# Patient Record
Sex: FEMALE | Race: BLACK OR AFRICAN AMERICAN | Employment: STUDENT | URBAN - METROPOLITAN AREA
[De-identification: names, ages, dates, MRNs, and addresses within clinical notes are randomized per-mention and may not be internally consistent; named-entity substitution may affect disease eponyms.]

---

## 2018-07-06 ENCOUNTER — HOSPITAL ENCOUNTER (OUTPATIENT)
Age: 23
Discharge: HOME | End: 2018-07-08
Payer: COMMERCIAL

## 2018-07-06 PROCEDURE — 86706 HEP B SURFACE ANTIBODY: CPT

## 2018-07-09 LAB — HBV SURFACE AB TITR SER: REACTIVE {TITER}

## 2019-08-09 ENCOUNTER — OFFICE VISIT (OUTPATIENT)
Dept: FAMILY MEDICINE CLINIC | Age: 24
End: 2019-08-09

## 2019-08-09 VITALS
SYSTOLIC BLOOD PRESSURE: 108 MMHG | RESPIRATION RATE: 16 BRPM | HEIGHT: 63 IN | DIASTOLIC BLOOD PRESSURE: 68 MMHG | WEIGHT: 119.6 LBS | BODY MASS INDEX: 21.19 KG/M2 | OXYGEN SATURATION: 99 % | TEMPERATURE: 98.7 F | HEART RATE: 82 BPM

## 2019-08-09 DIAGNOSIS — R07.0 PAIN IN THROAT: ICD-10-CM

## 2019-08-09 DIAGNOSIS — J01.90 ACUTE NON-RECURRENT SINUSITIS, UNSPECIFIED LOCATION: Primary | ICD-10-CM

## 2019-08-09 DIAGNOSIS — R09.82 POSTNASAL DRIP: ICD-10-CM

## 2019-08-09 PROCEDURE — 99213 OFFICE O/P EST LOW 20 MIN: CPT | Performed by: PHYSICIAN ASSISTANT

## 2019-08-09 RX ORDER — CEFUROXIME AXETIL 500 MG/1
500 TABLET ORAL 2 TIMES DAILY
Qty: 20 TABLET | Refills: 0 | Status: SHIPPED | OUTPATIENT
Start: 2019-08-09 | End: 2019-08-19

## 2019-08-09 RX ORDER — METHYLPREDNISOLONE 4 MG/1
TABLET ORAL
Qty: 1 KIT | Refills: 0 | Status: SHIPPED
Start: 2019-08-09 | End: 2020-08-28 | Stop reason: ALTCHOICE

## 2020-02-04 ENCOUNTER — NURSE ONLY (OUTPATIENT)
Dept: PRIMARY CARE CLINIC | Age: 25
End: 2020-02-04

## 2020-02-04 PROCEDURE — 86580 TB INTRADERMAL TEST: CPT | Performed by: NURSE PRACTITIONER

## 2020-02-06 LAB
INDURATION: NORMAL
TB SKIN TEST: NORMAL

## 2020-08-28 ENCOUNTER — OFFICE VISIT (OUTPATIENT)
Dept: FAMILY MEDICINE CLINIC | Age: 25
End: 2020-08-28
Payer: COMMERCIAL

## 2020-08-28 VITALS
TEMPERATURE: 97.3 F | BODY MASS INDEX: 20.73 KG/M2 | RESPIRATION RATE: 18 BRPM | DIASTOLIC BLOOD PRESSURE: 62 MMHG | HEART RATE: 87 BPM | WEIGHT: 117 LBS | SYSTOLIC BLOOD PRESSURE: 102 MMHG | HEIGHT: 63 IN | OXYGEN SATURATION: 97 %

## 2020-08-28 PROCEDURE — 99213 OFFICE O/P EST LOW 20 MIN: CPT | Performed by: PHYSICIAN ASSISTANT

## 2020-08-28 RX ORDER — NAPROXEN 500 MG/1
500 TABLET ORAL 2 TIMES DAILY WITH MEALS
Qty: 20 TABLET | Refills: 0 | Status: SHIPPED
Start: 2020-08-28 | End: 2021-05-24

## 2021-05-24 ENCOUNTER — OFFICE VISIT (OUTPATIENT)
Dept: FAMILY MEDICINE CLINIC | Age: 26
End: 2021-05-24
Payer: COMMERCIAL

## 2021-05-24 VITALS
HEART RATE: 88 BPM | WEIGHT: 119 LBS | TEMPERATURE: 97.6 F | SYSTOLIC BLOOD PRESSURE: 104 MMHG | OXYGEN SATURATION: 98 % | DIASTOLIC BLOOD PRESSURE: 64 MMHG | BODY MASS INDEX: 21.08 KG/M2

## 2021-05-24 DIAGNOSIS — Z00.00 ANNUAL PHYSICAL EXAM: Primary | ICD-10-CM

## 2021-05-24 PROCEDURE — 99395 PREV VISIT EST AGE 18-39: CPT | Performed by: PHYSICIAN ASSISTANT

## 2021-05-24 SDOH — ECONOMIC STABILITY: FOOD INSECURITY: WITHIN THE PAST 12 MONTHS, THE FOOD YOU BOUGHT JUST DIDN'T LAST AND YOU DIDN'T HAVE MONEY TO GET MORE.: NEVER TRUE

## 2021-05-24 SDOH — ECONOMIC STABILITY: FOOD INSECURITY: WITHIN THE PAST 12 MONTHS, YOU WORRIED THAT YOUR FOOD WOULD RUN OUT BEFORE YOU GOT MONEY TO BUY MORE.: NEVER TRUE

## 2021-05-24 SDOH — ECONOMIC STABILITY: TRANSPORTATION INSECURITY
IN THE PAST 12 MONTHS, HAS LACK OF TRANSPORTATION KEPT YOU FROM MEETINGS, WORK, OR FROM GETTING THINGS NEEDED FOR DAILY LIVING?: NO

## 2021-05-24 SDOH — ECONOMIC STABILITY: TRANSPORTATION INSECURITY
IN THE PAST 12 MONTHS, HAS THE LACK OF TRANSPORTATION KEPT YOU FROM MEDICAL APPOINTMENTS OR FROM GETTING MEDICATIONS?: NO

## 2021-05-24 ASSESSMENT — ENCOUNTER SYMPTOMS
ALLERGIC/IMMUNOLOGIC NEGATIVE: 1
EYES NEGATIVE: 1
GASTROINTESTINAL NEGATIVE: 1
RESPIRATORY NEGATIVE: 1

## 2021-05-24 ASSESSMENT — PATIENT HEALTH QUESTIONNAIRE - PHQ9
SUM OF ALL RESPONSES TO PHQ QUESTIONS 1-9: 0
2. FEELING DOWN, DEPRESSED OR HOPELESS: 0
SUM OF ALL RESPONSES TO PHQ QUESTIONS 1-9: 0
SUM OF ALL RESPONSES TO PHQ QUESTIONS 1-9: 0
1. LITTLE INTEREST OR PLEASURE IN DOING THINGS: 0

## 2021-05-24 NOTE — PROGRESS NOTES
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 She is from [facility]. She has no complaints. 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  She graduated with her bachelor's in that. She offers no complaints. She is only on her birth control. Current Outpatient Medications:     estradiol Valerate-dienogest (NATAZIA) 3/2-2/2-3/1 MG TABS tablet, Take 1 tablet by mouth daily, Disp: , Rfl:        No Known Allergies      Review of Systems  Review of Systems   Constitutional: Negative. HENT: Negative. Eyes: Negative. Respiratory: Negative. Cardiovascular: Negative. Gastrointestinal: Negative. Endocrine: Negative. Genitourinary: Negative. Musculoskeletal: Negative. Skin: Negative. Allergic/Immunologic: Negative. Neurological: Negative. Hematological: Negative. Psychiatric/Behavioral: Negative. VS:  /64   Pulse 88   Temp 97.6 °F (36.4 °C)   Wt 119 lb (54 kg)   SpO2 98%   BMI 21.08 kg/m²     Patient's medical, social, and family history reviewed      Physical Exam  Physical Exam  Vitals and nursing note reviewed. Constitutional:       General: She is not in acute distress. Appearance: Normal appearance. She is normal weight. HENT:      Head: Normocephalic. Right Ear: Tympanic membrane, ear canal and external ear normal. There is no impacted cerumen. Left Ear: Tympanic membrane, ear canal and external ear normal. There is no impacted cerumen. Nose: Nose normal.      Mouth/Throat:      Mouth: Mucous membranes are moist.   Eyes:      Extraocular Movements: Extraocular movements intact. Pupils: Pupils are equal, round, and reactive to light. Cardiovascular:      Rate and Rhythm: Normal rate and regular rhythm. Pulses: Normal pulses.       Heart sounds: Normal heart sounds. Pulmonary:      Effort: Pulmonary effort is normal.      Breath sounds: Normal breath sounds. Abdominal:      General: Bowel sounds are normal.      Palpations: Abdomen is soft. Musculoskeletal:         General: Normal range of motion. Cervical back: Normal range of motion and neck supple. Skin:     General: Skin is warm and dry. Neurological:      General: No focal deficit present. Mental Status: She is alert. Psychiatric:         Mood and Affect: Mood normal.         Behavior: Behavior normal.         Thought Content: Thought content normal.         Judgment: Judgment normal.           Assessment/Plan:  Cameron Monday was seen today for establish care. Diagnoses and all orders for this visit:    Annual physical exam      RTO 3-4 months for routine blood work. Return if symptoms worsen or fail to improve.      Santos Lundberg PA-C

## 2021-11-29 ENCOUNTER — OFFICE VISIT (OUTPATIENT)
Dept: FAMILY MEDICINE CLINIC | Age: 26
End: 2021-11-29
Payer: COMMERCIAL

## 2021-11-29 VITALS — OXYGEN SATURATION: 99 % | TEMPERATURE: 97.8 F | WEIGHT: 119 LBS | BODY MASS INDEX: 21.08 KG/M2 | HEART RATE: 84 BPM

## 2021-11-29 DIAGNOSIS — B96.89 ACUTE BACTERIAL SINUSITIS: Primary | ICD-10-CM

## 2021-11-29 DIAGNOSIS — J01.90 ACUTE BACTERIAL SINUSITIS: Primary | ICD-10-CM

## 2021-11-29 PROCEDURE — 99213 OFFICE O/P EST LOW 20 MIN: CPT | Performed by: FAMILY MEDICINE

## 2021-11-29 RX ORDER — AZITHROMYCIN 250 MG/1
250 TABLET, FILM COATED ORAL SEE ADMIN INSTRUCTIONS
Qty: 6 TABLET | Refills: 0 | Status: SHIPPED | OUTPATIENT
Start: 2021-11-29 | End: 2021-12-04

## 2021-11-29 RX ORDER — BENZONATATE 100 MG/1
100 CAPSULE ORAL 3 TIMES DAILY PRN
Qty: 30 CAPSULE | Refills: 0 | Status: SHIPPED | OUTPATIENT
Start: 2021-11-29 | End: 2021-12-06

## 2021-11-29 NOTE — PROGRESS NOTES
and Rhythm: Normal rate and regular rhythm. Heart sounds: No murmur heard. Pulmonary:      Effort: Pulmonary effort is normal.      Breath sounds: Normal breath sounds. No wheezing or rhonchi. Abdominal:      Tenderness: There is no abdominal tenderness. Musculoskeletal:      Cervical back: No tenderness. Lymphadenopathy:      Cervical: No cervical adenopathy. Skin:     Coloration: Skin is not jaundiced. Findings: No bruising or rash. Neurological:      General: No focal deficit present. Mental Status: She is alert and oriented to person, place, and time. Psychiatric:         Mood and Affect: Mood normal.         Behavior: Behavior normal.           Marilou was seen today for cough, congestion and other. Diagnoses and all orders for this visit:    Acute bacterial sinusitis  -     azithromycin (ZITHROMAX) 250 MG tablet; Take 1 tablet by mouth See Admin Instructions for 5 days 500mg on day 1 followed by 250mg on days 2 - 5    Other orders  -     benzonatate (TESSALON) 100 MG capsule; Take 1 capsule by mouth 3 times daily as needed for Cough  Had fever for 1-2 days then urticaria for about a day on neck 10 days after initial vaccine. Will takes Xyzal 5 mg prior to vaccine and for 2-3 days after. Should probably wait a week to get vaccine  No steroid ordered due to proximity to vaccine scheduled. Needs vaccine, very high risk occupation        No follow-ups on file.     Electronically signed by Rene Disla MD on 11/29/21 at 10:48 AM EST

## 2024-05-30 ENCOUNTER — ANCILLARY PROCEDURE (OUTPATIENT)
Dept: OBGYN CLINIC | Age: 29
End: 2024-05-30
Payer: COMMERCIAL

## 2024-05-30 ENCOUNTER — INITIAL PRENATAL (OUTPATIENT)
Dept: OBGYN CLINIC | Age: 29
End: 2024-05-30
Payer: COMMERCIAL

## 2024-05-30 VITALS
WEIGHT: 133 LBS | DIASTOLIC BLOOD PRESSURE: 77 MMHG | HEART RATE: 3 BPM | BODY MASS INDEX: 21.47 KG/M2 | SYSTOLIC BLOOD PRESSURE: 112 MMHG

## 2024-05-30 DIAGNOSIS — O44.40 LOW-LYING PLACENTA: ICD-10-CM

## 2024-05-30 DIAGNOSIS — Z3A.21 21 WEEKS GESTATION OF PREGNANCY: Primary | ICD-10-CM

## 2024-05-30 DIAGNOSIS — O43.199 MARGINAL INSERTION OF UMBILICAL CORD AFFECTING MANAGEMENT OF MOTHER: ICD-10-CM

## 2024-05-30 DIAGNOSIS — Z34.02 PRIMIGRAVIDA IN SECOND TRIMESTER: ICD-10-CM

## 2024-05-30 LAB
GLUCOSE URINE, POC: NORMAL
PROTEIN UA: NEGATIVE

## 2024-05-30 PROCEDURE — 81002 URINALYSIS NONAUTO W/O SCOPE: CPT | Performed by: OBSTETRICS & GYNECOLOGY

## 2024-05-30 PROCEDURE — 76811 OB US DETAILED SNGL FETUS: CPT | Performed by: OBSTETRICS & GYNECOLOGY

## 2024-05-30 PROCEDURE — 76817 TRANSVAGINAL US OBSTETRIC: CPT | Performed by: OBSTETRICS & GYNECOLOGY

## 2024-05-30 PROCEDURE — 99204 OFFICE O/P NEW MOD 45 MIN: CPT | Performed by: OBSTETRICS & GYNECOLOGY

## 2024-05-30 PROCEDURE — 99203 OFFICE O/P NEW LOW 30 MIN: CPT | Performed by: OBSTETRICS & GYNECOLOGY

## 2024-05-30 NOTE — PROGRESS NOTES
Patient is here today for ob new visit. Denies any vaginal bleeding, cramping, or leakage of fluids.  Slight movement.   
marginal cord insertion.  I talked to them about the fact that sometimes if something is affected the placenta it may affect fetal growth and it is not unreasonable to check fetal growth every 4 weeks.  We discussed mode of delivery.  Where the patient comes from it is not unusual to have elective primary  sections.  I told the patient I did not necessarily recommend that and that vaginal delivery was safer for the mother with no increased risk for the baby.    IMPRESSION:  1. Single intrauterine gestation at 21+ weeks with marginal cord insertion and appropriate growth.  2.  No obvious fetal anomalies are noted.  The fetus is in a breech presentation.  3.  The amniotic fluid volume is normal and the placenta is posterior.    RECOMMENDATIONS:  Each of the recommendations were discussed with the patient:  1.  Growth ultrasounds every 4 weeks.  These can be done in your office or I can do them I would leave that choice up to you.  2.  Follow-up would be otherwise as clinically indicated.    The patient is to continue to follow with you in your office for ongoing obstetric care.     PLAN:    As noted above or sooner prn.    Sincerely,        Román Garcia MD    I spent 45 minutes of direct contact time with the patient of which greater than 50% of the time was used to  the patient, discuss complications and problems related to her pregnancy, or coordinating her care in addition to the time spent interpreting the ultrasound. I answered all of her questions to her satisfaction.

## 2024-09-27 ENCOUNTER — ANESTHESIA (OUTPATIENT)
Dept: LABOR AND DELIVERY | Age: 29
End: 2024-09-27
Payer: COMMERCIAL

## 2024-09-27 ENCOUNTER — HOSPITAL ENCOUNTER (INPATIENT)
Age: 29
LOS: 3 days | Discharge: HOME | End: 2024-09-30
Attending: OBSTETRICS & GYNECOLOGY | Admitting: OBSTETRICS & GYNECOLOGY
Payer: COMMERCIAL

## 2024-09-27 ENCOUNTER — ANESTHESIA EVENT (OUTPATIENT)
Dept: LABOR AND DELIVERY | Age: 29
End: 2024-09-27
Payer: COMMERCIAL

## 2024-09-27 PROBLEM — Z3A.39 39 WEEKS GESTATION OF PREGNANCY: Status: ACTIVE | Noted: 2024-09-27

## 2024-09-27 LAB
ABO + RH BLD: NORMAL
ARM BAND NUMBER: NORMAL
BASOPHILS # BLD: 0.03 K/UL (ref 0–0.2)
BASOPHILS NFR BLD: 0 % (ref 0–2)
BLOOD BANK SAMPLE EXPIRATION: NORMAL
BLOOD GROUP ANTIBODIES SERPL: NEGATIVE
EOSINOPHIL # BLD: 0.02 K/UL (ref 0.05–0.5)
EOSINOPHILS RELATIVE PERCENT: 0 % (ref 0–6)
ERYTHROCYTE [DISTWIDTH] IN BLOOD BY AUTOMATED COUNT: 14.3 % (ref 11.5–15)
HCT VFR BLD AUTO: 31.7 % (ref 34–48)
HGB BLD-MCNC: 10.2 G/DL (ref 11.5–15.5)
IMM GRANULOCYTES # BLD AUTO: 0.06 K/UL (ref 0–0.58)
IMM GRANULOCYTES NFR BLD: 1 % (ref 0–5)
LYMPHOCYTES NFR BLD: 2.24 K/UL (ref 1.5–4)
LYMPHOCYTES RELATIVE PERCENT: 19 % (ref 20–42)
MCH RBC QN AUTO: 27.1 PG (ref 26–35)
MCHC RBC AUTO-ENTMCNC: 32.2 G/DL (ref 32–34.5)
MCV RBC AUTO: 84.1 FL (ref 80–99.9)
MONOCYTES NFR BLD: 1.08 K/UL (ref 0.1–0.95)
MONOCYTES NFR BLD: 9 % (ref 2–12)
NEUTROPHILS NFR BLD: 72 % (ref 43–80)
NEUTS SEG NFR BLD: 8.7 K/UL (ref 1.8–7.3)
PLATELET # BLD AUTO: 219 K/UL (ref 130–450)
PMV BLD AUTO: 11.9 FL (ref 7–12)
RBC # BLD AUTO: 3.77 M/UL (ref 3.5–5.5)
WBC OTHER # BLD: 12.1 K/UL (ref 4.5–11.5)

## 2024-09-27 PROCEDURE — 86901 BLOOD TYPING SEROLOGIC RH(D): CPT

## 2024-09-27 PROCEDURE — 99221 1ST HOSP IP/OBS SF/LOW 40: CPT | Performed by: STUDENT IN AN ORGANIZED HEALTH CARE EDUCATION/TRAINING PROGRAM

## 2024-09-27 PROCEDURE — 1220000001 HC SEMI PRIVATE L&D R&B

## 2024-09-27 PROCEDURE — 2580000003 HC RX 258: Performed by: OBSTETRICS & GYNECOLOGY

## 2024-09-27 PROCEDURE — 80307 DRUG TEST PRSMV CHEM ANLYZR: CPT

## 2024-09-27 PROCEDURE — 86850 RBC ANTIBODY SCREEN: CPT

## 2024-09-27 PROCEDURE — 86900 BLOOD TYPING SEROLOGIC ABO: CPT

## 2024-09-27 PROCEDURE — 85025 COMPLETE CBC W/AUTO DIFF WBC: CPT

## 2024-09-27 RX ORDER — SODIUM CHLORIDE 0.9 % (FLUSH) 0.9 %
5-40 SYRINGE (ML) INJECTION PRN
Status: DISCONTINUED | OUTPATIENT
Start: 2024-09-27 | End: 2024-09-28

## 2024-09-27 RX ORDER — SODIUM CHLORIDE, SODIUM LACTATE, POTASSIUM CHLORIDE, AND CALCIUM CHLORIDE .6; .31; .03; .02 G/100ML; G/100ML; G/100ML; G/100ML
500 INJECTION, SOLUTION INTRAVENOUS PRN
Status: DISCONTINUED | OUTPATIENT
Start: 2024-09-27 | End: 2024-09-28

## 2024-09-27 RX ORDER — TERBUTALINE SULFATE 1 MG/ML
0.25 INJECTION, SOLUTION SUBCUTANEOUS
Status: DISCONTINUED | OUTPATIENT
Start: 2024-09-27 | End: 2024-09-28

## 2024-09-27 RX ORDER — SODIUM CHLORIDE 0.9 % (FLUSH) 0.9 %
5-40 SYRINGE (ML) INJECTION EVERY 12 HOURS SCHEDULED
Status: DISCONTINUED | OUTPATIENT
Start: 2024-09-27 | End: 2024-09-28

## 2024-09-27 RX ORDER — NALOXONE HYDROCHLORIDE 0.4 MG/ML
INJECTION, SOLUTION INTRAMUSCULAR; INTRAVENOUS; SUBCUTANEOUS PRN
Status: DISCONTINUED | OUTPATIENT
Start: 2024-09-27 | End: 2024-09-28

## 2024-09-27 RX ORDER — SODIUM CHLORIDE, SODIUM LACTATE, POTASSIUM CHLORIDE, CALCIUM CHLORIDE 600; 310; 30; 20 MG/100ML; MG/100ML; MG/100ML; MG/100ML
INJECTION, SOLUTION INTRAVENOUS CONTINUOUS
Status: DISCONTINUED | OUTPATIENT
Start: 2024-09-27 | End: 2024-09-28

## 2024-09-27 RX ORDER — EPHEDRINE SULFATE/0.9% NACL/PF 25 MG/5 ML
10 SYRINGE (ML) INTRAVENOUS
Status: DISCONTINUED | OUTPATIENT
Start: 2024-09-27 | End: 2024-09-28

## 2024-09-27 RX ORDER — ONDANSETRON 4 MG/1
4 TABLET, ORALLY DISINTEGRATING ORAL EVERY 6 HOURS PRN
Status: DISCONTINUED | OUTPATIENT
Start: 2024-09-27 | End: 2024-09-28

## 2024-09-27 RX ORDER — MISOPROSTOL 200 UG/1
400 TABLET ORAL PRN
Status: DISCONTINUED | OUTPATIENT
Start: 2024-09-27 | End: 2024-09-28

## 2024-09-27 RX ORDER — DOCUSATE SODIUM 100 MG/1
100 CAPSULE, LIQUID FILLED ORAL 2 TIMES DAILY
Status: DISCONTINUED | OUTPATIENT
Start: 2024-09-27 | End: 2024-09-28

## 2024-09-27 RX ORDER — TRANEXAMIC ACID 10 MG/ML
1000 INJECTION, SOLUTION INTRAVENOUS
Status: DISCONTINUED | OUTPATIENT
Start: 2024-09-27 | End: 2024-09-28 | Stop reason: SDUPTHER

## 2024-09-27 RX ORDER — ACETAMINOPHEN 650 MG
TABLET, EXTENDED RELEASE ORAL
Status: COMPLETED
Start: 2024-09-27 | End: 2024-09-28

## 2024-09-27 RX ORDER — ONDANSETRON 2 MG/ML
4 INJECTION INTRAMUSCULAR; INTRAVENOUS EVERY 6 HOURS PRN
Status: DISCONTINUED | OUTPATIENT
Start: 2024-09-27 | End: 2024-09-28

## 2024-09-27 RX ORDER — EPHEDRINE SULFATE/0.9% NACL/PF 25 MG/5 ML
5 SYRINGE (ML) INTRAVENOUS PRN
Status: DISCONTINUED | OUTPATIENT
Start: 2024-09-28 | End: 2024-09-28

## 2024-09-27 RX ORDER — SODIUM CHLORIDE 9 MG/ML
25 INJECTION, SOLUTION INTRAVENOUS PRN
Status: DISCONTINUED | OUTPATIENT
Start: 2024-09-27 | End: 2024-09-28

## 2024-09-27 RX ORDER — CARBOPROST TROMETHAMINE 250 UG/ML
250 INJECTION, SOLUTION INTRAMUSCULAR PRN
Status: DISCONTINUED | OUTPATIENT
Start: 2024-09-27 | End: 2024-09-28

## 2024-09-27 RX ORDER — LIDOCAINE HYDROCHLORIDE 10 MG/ML
INJECTION, SOLUTION INFILTRATION; PERINEURAL
Status: COMPLETED
Start: 2024-09-27 | End: 2024-09-28

## 2024-09-27 RX ORDER — METHYLERGONOVINE MALEATE 0.2 MG/ML
200 INJECTION INTRAVENOUS PRN
Status: DISCONTINUED | OUTPATIENT
Start: 2024-09-27 | End: 2024-09-28

## 2024-09-27 RX ADMIN — SODIUM CHLORIDE, POTASSIUM CHLORIDE, SODIUM LACTATE AND CALCIUM CHLORIDE: 600; 310; 30; 20 INJECTION, SOLUTION INTRAVENOUS at 21:11

## 2024-09-28 PROBLEM — Z37.9 NORMAL LABOR: Status: ACTIVE | Noted: 2024-09-28

## 2024-09-28 PROBLEM — Z34.03 PRIMIGRAVIDA IN THIRD TRIMESTER: Status: ACTIVE | Noted: 2024-05-30

## 2024-09-28 PROBLEM — O09.813 PREGNANCY RESULTING FROM IN VITRO FERTILIZATION IN THIRD TRIMESTER: Status: ACTIVE | Noted: 2024-05-30

## 2024-09-28 PROBLEM — D50.9 IRON DEFICIENCY ANEMIA OF PREGNANCY: Status: ACTIVE | Noted: 2024-09-28

## 2024-09-28 PROBLEM — O26.843 FUNDAL HEIGHT LOW FOR DATES IN THIRD TRIMESTER: Status: ACTIVE | Noted: 2024-09-28

## 2024-09-28 PROBLEM — Z87.42 HISTORY OF POLYCYSTIC OVARIAN SYNDROME: Status: ACTIVE | Noted: 2023-01-01

## 2024-09-28 PROBLEM — S31.41XA LACERATION OF LABIA MAJORA: Status: ACTIVE | Noted: 2024-09-28

## 2024-09-28 PROBLEM — O99.019 IRON DEFICIENCY ANEMIA OF PREGNANCY: Status: ACTIVE | Noted: 2024-09-28

## 2024-09-28 LAB
AMPHET UR QL SCN: NEGATIVE
BARBITURATES UR QL SCN: NEGATIVE
BENZODIAZ UR QL: NEGATIVE
BUPRENORPHINE UR QL: NEGATIVE
CANNABINOIDS UR QL SCN: NEGATIVE
COCAINE UR QL SCN: NEGATIVE
FENTANYL UR QL: NEGATIVE
METHADONE UR QL: NEGATIVE
OPIATES UR QL SCN: NEGATIVE
OXYCODONE UR QL SCN: NEGATIVE
PCP UR QL SCN: NEGATIVE
TEST INFORMATION: NORMAL

## 2024-09-28 PROCEDURE — 6360000002 HC RX W HCPCS: Performed by: OBSTETRICS & GYNECOLOGY

## 2024-09-28 PROCEDURE — 7200000001 HC VAGINAL DELIVERY

## 2024-09-28 PROCEDURE — 6370000000 HC RX 637 (ALT 250 FOR IP): Performed by: OBSTETRICS & GYNECOLOGY

## 2024-09-28 PROCEDURE — 0KQM0ZZ REPAIR PERINEUM MUSCLE, OPEN APPROACH: ICD-10-PCS | Performed by: OBSTETRICS & GYNECOLOGY

## 2024-09-28 PROCEDURE — 2500000003 HC RX 250 WO HCPCS: Performed by: NURSE ANESTHETIST, CERTIFIED REGISTERED

## 2024-09-28 PROCEDURE — 3700000025 EPIDURAL BLOCK: Performed by: ANESTHESIOLOGY

## 2024-09-28 PROCEDURE — 2580000003 HC RX 258: Performed by: OBSTETRICS & GYNECOLOGY

## 2024-09-28 PROCEDURE — 1220000000 HC SEMI PRIVATE OB R&B

## 2024-09-28 PROCEDURE — 0UQMXZZ REPAIR VULVA, EXTERNAL APPROACH: ICD-10-PCS | Performed by: OBSTETRICS & GYNECOLOGY

## 2024-09-28 PROCEDURE — 51701 INSERT BLADDER CATHETER: CPT

## 2024-09-28 PROCEDURE — 2500000003 HC RX 250 WO HCPCS

## 2024-09-28 PROCEDURE — 10907ZC DRAINAGE OF AMNIOTIC FLUID, THERAPEUTIC FROM PRODUCTS OF CONCEPTION, VIA NATURAL OR ARTIFICIAL OPENING: ICD-10-PCS | Performed by: OBSTETRICS & GYNECOLOGY

## 2024-09-28 RX ORDER — MISOPROSTOL 200 UG/1
400 TABLET ORAL PRN
Status: DISCONTINUED | OUTPATIENT
Start: 2024-09-28 | End: 2024-09-30 | Stop reason: HOSPADM

## 2024-09-28 RX ORDER — ACETAMINOPHEN 650 MG
TABLET, EXTENDED RELEASE ORAL ONCE
Status: COMPLETED | OUTPATIENT
Start: 2024-09-28 | End: 2024-09-28

## 2024-09-28 RX ORDER — METHYLERGONOVINE MALEATE 0.2 MG/ML
200 INJECTION INTRAVENOUS PRN
Status: DISCONTINUED | OUTPATIENT
Start: 2024-09-28 | End: 2024-09-30 | Stop reason: HOSPADM

## 2024-09-28 RX ORDER — ONDANSETRON 2 MG/ML
4 INJECTION INTRAMUSCULAR; INTRAVENOUS EVERY 6 HOURS PRN
Status: DISCONTINUED | OUTPATIENT
Start: 2024-09-28 | End: 2024-09-30 | Stop reason: HOSPADM

## 2024-09-28 RX ORDER — PRENATAL WITH FERROUS FUM AND FOLIC ACID 3080; 920; 120; 400; 22; 1.84; 3; 20; 10; 1; 12; 200; 27; 25; 2 [IU]/1; [IU]/1; MG/1; [IU]/1; MG/1; MG/1; MG/1; MG/1; MG/1; MG/1; UG/1; MG/1; MG/1; MG/1; MG/1
1 TABLET ORAL
Status: DISCONTINUED | OUTPATIENT
Start: 2024-09-29 | End: 2024-09-30 | Stop reason: HOSPADM

## 2024-09-28 RX ORDER — ONDANSETRON 4 MG/1
4 TABLET, ORALLY DISINTEGRATING ORAL EVERY 6 HOURS PRN
Status: DISCONTINUED | OUTPATIENT
Start: 2024-09-28 | End: 2024-09-30 | Stop reason: HOSPADM

## 2024-09-28 RX ORDER — ACETAMINOPHEN 500 MG
1000 TABLET ORAL EVERY 6 HOURS PRN
Status: DISCONTINUED | OUTPATIENT
Start: 2024-09-28 | End: 2024-09-30 | Stop reason: HOSPADM

## 2024-09-28 RX ORDER — CARBOPROST TROMETHAMINE 250 UG/ML
250 INJECTION, SOLUTION INTRAMUSCULAR PRN
Status: DISCONTINUED | OUTPATIENT
Start: 2024-09-28 | End: 2024-09-30 | Stop reason: HOSPADM

## 2024-09-28 RX ORDER — FERROUS SULFATE 325(65) MG
325 TABLET ORAL 2 TIMES DAILY WITH MEALS
Status: DISCONTINUED | OUTPATIENT
Start: 2024-09-28 | End: 2024-09-30 | Stop reason: HOSPADM

## 2024-09-28 RX ORDER — TRANEXAMIC ACID 10 MG/ML
1000 INJECTION, SOLUTION INTRAVENOUS
Status: ACTIVE | OUTPATIENT
Start: 2024-09-28 | End: 2024-09-29

## 2024-09-28 RX ORDER — LIDOCAINE HYDROCHLORIDE 10 MG/ML
20 INJECTION, SOLUTION INFILTRATION; PERINEURAL ONCE
Status: COMPLETED | OUTPATIENT
Start: 2024-09-28 | End: 2024-09-28

## 2024-09-28 RX ORDER — DOCUSATE SODIUM 100 MG/1
100 CAPSULE, LIQUID FILLED ORAL 2 TIMES DAILY
Status: DISCONTINUED | OUTPATIENT
Start: 2024-09-28 | End: 2024-09-30 | Stop reason: HOSPADM

## 2024-09-28 RX ORDER — MISOPROSTOL 200 UG/1
800 TABLET ORAL PRN
Status: DISCONTINUED | OUTPATIENT
Start: 2024-09-28 | End: 2024-09-30 | Stop reason: HOSPADM

## 2024-09-28 RX ORDER — IBUPROFEN 600 MG/1
600 TABLET, FILM COATED ORAL EVERY 6 HOURS PRN
Status: DISCONTINUED | OUTPATIENT
Start: 2024-09-28 | End: 2024-09-30 | Stop reason: HOSPADM

## 2024-09-28 RX ORDER — MODIFIED LANOLIN
OINTMENT (GRAM) TOPICAL PRN
Status: DISCONTINUED | OUTPATIENT
Start: 2024-09-28 | End: 2024-09-30 | Stop reason: HOSPADM

## 2024-09-28 RX ADMIN — Medication 87.3 MILLI-UNITS/MIN: at 11:35

## 2024-09-28 RX ADMIN — LIDOCAINE HYDROCHLORIDE 20 ML: 10 INJECTION, SOLUTION INFILTRATION; PERINEURAL at 11:00

## 2024-09-28 RX ADMIN — WITCH HAZEL: 500 SOLUTION RECTAL; TOPICAL at 16:19

## 2024-09-28 RX ADMIN — Medication: at 11:00

## 2024-09-28 RX ADMIN — Medication 1 MILLI-UNITS/MIN: at 03:55

## 2024-09-28 RX ADMIN — Medication: at 16:20

## 2024-09-28 RX ADMIN — ACETAMINOPHEN 1000 MG: 500 TABLET ORAL at 19:16

## 2024-09-28 RX ADMIN — SODIUM CHLORIDE, POTASSIUM CHLORIDE, SODIUM LACTATE AND CALCIUM CHLORIDE: 600; 310; 30; 20 INJECTION, SOLUTION INTRAVENOUS at 07:17

## 2024-09-28 RX ADMIN — Medication 87.3 MILLI-UNITS/MIN: at 12:03

## 2024-09-28 RX ADMIN — Medication 4 ML: at 00:20

## 2024-09-28 RX ADMIN — Medication 15 ML/HR: at 08:39

## 2024-09-28 RX ADMIN — Medication 166.7 ML: at 11:23

## 2024-09-28 RX ADMIN — Medication 15 ML/HR: at 00:22

## 2024-09-28 RX ADMIN — IBUPROFEN 600 MG: 600 TABLET, FILM COATED ORAL at 22:36

## 2024-09-28 RX ADMIN — ONDANSETRON 4 MG: 2 INJECTION INTRAMUSCULAR; INTRAVENOUS at 01:21

## 2024-09-28 RX ADMIN — DOCUSATE SODIUM 100 MG: 100 CAPSULE, LIQUID FILLED ORAL at 19:16

## 2024-09-28 RX ADMIN — IBUPROFEN 600 MG: 600 TABLET, FILM COATED ORAL at 15:52

## 2024-09-28 ASSESSMENT — PAIN SCALES - GENERAL
PAINLEVEL_OUTOF10: 6
PAINLEVEL_OUTOF10: 6
PAINLEVEL_OUTOF10: 5

## 2024-09-28 ASSESSMENT — PAIN DESCRIPTION - DESCRIPTORS
DESCRIPTORS: ACHING;CRAMPING;THROBBING
DESCRIPTORS: ACHING;DISCOMFORT
DESCRIPTORS: CRAMPING

## 2024-09-28 ASSESSMENT — PAIN DESCRIPTION - ORIENTATION: ORIENTATION: RIGHT;LOWER

## 2024-09-28 ASSESSMENT — PAIN DESCRIPTION - LOCATION
LOCATION: ABDOMEN;PERINEUM
LOCATION: ABDOMEN;PERINEUM
LOCATION: PERINEUM

## 2024-09-28 NOTE — L&D DELIVERY NOTE
Department of Obstetrics and Gynecology  Spontaneous Vaginal Delivery Note    Patient:  Marilou Mosqueda     Admit Date:  2024  8:13 PM  Medical Record Number:  [id number]   Procedure Date: 2024 12:41 PM     Pre-delivery Diagnosis: Primigravida IUP at 39 weeks 1 day in spontaneous labor, meconium fluid, marginal insertion of umbilical cord, fundal height low for dates (starting at 36 weeks, EFW 7#0 at 37w 48.7%), iron deficiency anemia, history of PCOS and infertility, IVF pregnancy  Patient Active Problem List   Diagnosis    Primigravida in third trimester    Pregnancy resulting from in vitro fertilization in third trimester    Marginal insertion of umbilical cord affecting management of mother    39 1/7 weeks gestation of pregnancy    Infertility, 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    History of polycystic ovarian syndrome - dx by WILL ; failed IUI x 3, +HCG with second IVF    Iron deficiency anemia of pregnancy    Fundal height low for dates in third trimester starting at 36w (34 cm, EFW 7#0 48.7% at 37w6d)    Normal spontaneous labor    Meconium in amniotic fluid     Post-delivery Diagnosis:  Living  infant Female, tight nuchal cord x 2, second-degree perineal laceration, bilateral labia majora lacerations, postpartum hemorrhage without atony  Patient Active Problem List   Diagnosis    Primigravida in third trimester    Pregnancy resulting from in vitro fertilization in third trimester    Marginal insertion of umbilical cord affecting management of mother    39 1/7 weeks gestation of pregnancy    Infertility, 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

## 2024-09-28 NOTE — PROGRESS NOTES
Call to Dr. Lambert updated on patient case. SVE complete and 0 station. Patient repositioned in knee chest to labor down. Feeling constant pressure.    Orders to notify when patient begins to push.

## 2024-09-28 NOTE — PROGRESS NOTES
presents at 39w with complaints of ctx's that started this morning irregularly and have gotten more intense and every 5 minutes around 1740 this evening. Pt denies LOF and VB. Pt states +FM. Placed on EFM, call light within reach.

## 2024-09-28 NOTE — H&P
monitor  -Vertex by sutures  -Type & screen    D/w  who is covering for Dr.Koulianos Moe Garibay MD, MPH

## 2024-09-28 NOTE — PROGRESS NOTES
Dr. Lambert called to unit for patient update. SVE 7/90/0. Ctx's q2-4.5 minutes. FHT moderate variability with accelerations and early decelerations.     Patient was AROM this morning at 0650 for thick meconium.    Continuing to update as needed.

## 2024-09-28 NOTE — ANESTHESIA PRE PROCEDURE
Answered      Vital Signs (Current):   Vitals:    09/27/24 2037 09/27/24 2206   BP:  137/88   Pulse:  88   Resp:  16   Temp:  36.9 °C (98.5 °F)   TempSrc:  Oral   SpO2:  100%   Weight: 68 kg (150 lb)    Height: 1.676 m (5' 6\")                                               BP Readings from Last 3 Encounters:   09/27/24 137/88   09/23/24 120/74   09/19/24 110/72       NPO Status:                                                                                 BMI:   Wt Readings from Last 3 Encounters:   09/27/24 68 kg (150 lb)   09/23/24 72.6 kg (160 lb)   09/19/24 70.7 kg (155 lb 12.8 oz)     Body mass index is 24.21 kg/m².    CBC:   Lab Results   Component Value Date/Time    WBC 12.1 09/27/2024 09:10 PM    RBC 3.77 09/27/2024 09:10 PM    HGB 10.2 09/27/2024 09:10 PM    HCT 31.7 09/27/2024 09:10 PM    MCV 84.1 09/27/2024 09:10 PM    RDW 14.3 09/27/2024 09:10 PM     09/27/2024 09:10 PM       CMP: No results found for: \"NA\", \"K\", \"CL\", \"CO2\", \"BUN\", \"CREATININE\", \"GFRAA\", \"AGRATIO\", \"LABGLOM\", \"GLUCOSE\", \"GLU\", \"CALCIUM\", \"BILITOT\", \"ALKPHOS\", \"AST\", \"ALT\"    POC Tests: No results for input(s): \"POCGLU\", \"POCNA\", \"POCK\", \"POCCL\", \"POCBUN\", \"POCHEMO\", \"POCHCT\" in the last 72 hours.    Coags: No results found for: \"PROTIME\", \"INR\", \"APTT\"    HCG (If Applicable): No results found for: \"PREGTESTUR\", \"PREGSERUM\", \"HCG\", \"HCGQUANT\"     ABGs: No results found for: \"PHART\", \"PO2ART\", \"KSA7PZM\", \"ZTQ4IMJ\", \"BEART\", \"L2BFGHTE\"     Type & Screen (If Applicable):  Lab Results   Component Value Date    ABORH O POSITIVE 09/27/2024    LABANTI NEGATIVE 09/27/2024       Drug/Infectious Status (If Applicable):  Lab Results   Component Value Date/Time    HEPCAB NONREACTIVE 04/09/2024 05:26 PM       COVID-19 Screening (If Applicable): No results found for: \"COVID19\"        Anesthesia Evaluation  Patient summary reviewed and Nursing notes reviewed   no history of anesthetic complications:   Airway: Mallampati: II  TM distance: >3 FB

## 2024-09-28 NOTE — PROGRESS NOTES
Dr. Garcia called in for an update, ctx's every 3-5 minutes. New orders received to have house officer AROM patient.

## 2024-09-28 NOTE — PROGRESS NOTES
Dr. Garcia updated, SVE 5/90/-1, ctx's every 3-8 minutes, comfortable with an epidural. New orders received to start pitocin.

## 2024-09-28 NOTE — PROGRESS NOTES
Dr. Garcia updated, SVE 5/90/-1, AROM for thick meconium at 0650. Ctx's every 3-4 minutes, pitocin is at 8. States to have NICU for delivery due to meconium and that Dr. Lambert is on now and he will update him.

## 2024-09-28 NOTE — PROGRESS NOTES
Patient admitted into room and oriented to surroundings. Introduced self and wrote this RN's name and phone extension on patient's white board. Phone and nurse's call light at patient's bedside and instructed to use for any needs. Patient instructed on new admission informational packet at bedside with information on infant testing to be done when infant is 24 hours old including ODH labs, 24 hours blood sugar, and CCHD. Patient instructed on mom baby unit policies and procedures including infant safety and fall prevention, of need to keep infant in bassinet for transport in hallways, and for infant to sleep alone, on back, in an empty bassinet. Patient also instructed on unit visitation policy and that one same support person 18 years old or older may stay overnight if desired. Patient verbalized understanding of all of the above. Refused Tdap

## 2024-09-28 NOTE — PROGRESS NOTES
Dr. Garcia called in for an update, patient was not ready for an epidural earlier, however has since gotten more uncomfortable and is now bolusing for an epidural. SVE still 4/90/-2. New orders received to get her comfortable, recheck in a few hours and call with an update.

## 2024-09-28 NOTE — ANESTHESIA PROCEDURE NOTES
Epidural Block    Patient location during procedure: OB  Start time: 9/28/2024 12:05 AM  End time: 9/28/2024 12:20 AM  Reason for block: labor epidural  Staffing  Performed: resident/CRNA   Anesthesiologist: Graham Garcia DO  Resident/CRNA: Marc Bustamante APRN - CRNA  Performed by: Marc Bustamante APRN - CRNA  Authorized by: Graham Garcia DO    Epidural  Patient position: sitting  Prep: ChloraPrep  Patient monitoring: continuous pulse ox and frequent blood pressure checks  Approach: midline  Location: L3-4  Injection technique: NEENA air  Provider prep: mask and sterile gloves  Needle  Needle type: Tuohy   Needle gauge: 17 G  Needle length: 3.5 in  Needle insertion depth: 5 cm  Catheter type: multi-orifice  Catheter size: 20 G  Catheter at skin depth: 12 cm  Test dose: negativeCatheter Secured: tegaderm and tape  Assessment  Sensory level: T10  Hemodynamics: stable  Attempts: 1  Outcomes: uncomplicated and patient tolerated procedure well  Preanesthetic Checklist  Completed: patient identified, IV checked, site marked, risks and benefits discussed, surgical/procedural consents, equipment checked, pre-op evaluation, timeout performed, anesthesia consent given, oxygen available, monitors applied/VS acknowledged, fire risk safety assessment completed and verbalized and blood product R/B/A discussed and consented

## 2024-09-29 PROBLEM — D62 ACUTE BLOOD LOSS ANEMIA: Status: ACTIVE | Noted: 2024-09-29

## 2024-09-29 LAB
HCT VFR BLD AUTO: 22.6 % (ref 34–48)
HGB BLD-MCNC: 7.2 G/DL (ref 11.5–15.5)

## 2024-09-29 PROCEDURE — 85018 HEMOGLOBIN: CPT

## 2024-09-29 PROCEDURE — 1220000000 HC SEMI PRIVATE OB R&B

## 2024-09-29 PROCEDURE — 6370000000 HC RX 637 (ALT 250 FOR IP): Performed by: OBSTETRICS & GYNECOLOGY

## 2024-09-29 PROCEDURE — 85014 HEMATOCRIT: CPT

## 2024-09-29 RX ADMIN — PRENATAL WITH FERROUS FUM AND FOLIC ACID 1 TABLET: 3080; 920; 120; 400; 22; 1.84; 3; 20; 10; 1; 12; 200; 27; 25; 2 TABLET ORAL at 08:30

## 2024-09-29 RX ADMIN — ACETAMINOPHEN 1000 MG: 500 TABLET ORAL at 06:49

## 2024-09-29 RX ADMIN — IBUPROFEN 600 MG: 600 TABLET, FILM COATED ORAL at 04:03

## 2024-09-29 RX ADMIN — IBUPROFEN 600 MG: 600 TABLET, FILM COATED ORAL at 10:07

## 2024-09-29 RX ADMIN — ACETAMINOPHEN 1000 MG: 500 TABLET ORAL at 20:56

## 2024-09-29 RX ADMIN — FERROUS SULFATE TAB 325 MG (65 MG ELEMENTAL FE) 325 MG: 325 (65 FE) TAB at 16:25

## 2024-09-29 RX ADMIN — FERROUS SULFATE TAB 325 MG (65 MG ELEMENTAL FE) 325 MG: 325 (65 FE) TAB at 08:30

## 2024-09-29 RX ADMIN — IBUPROFEN 600 MG: 600 TABLET, FILM COATED ORAL at 16:25

## 2024-09-29 RX ADMIN — DOCUSATE SODIUM 100 MG: 100 CAPSULE, LIQUID FILLED ORAL at 08:30

## 2024-09-29 RX ADMIN — DOCUSATE SODIUM 100 MG: 100 CAPSULE, LIQUID FILLED ORAL at 20:56

## 2024-09-29 RX ADMIN — IBUPROFEN 600 MG: 600 TABLET, FILM COATED ORAL at 23:04

## 2024-09-29 RX ADMIN — ACETAMINOPHEN 1000 MG: 500 TABLET ORAL at 00:56

## 2024-09-29 ASSESSMENT — PAIN DESCRIPTION - DESCRIPTORS
DESCRIPTORS: CRAMPING
DESCRIPTORS: CRAMPING
DESCRIPTORS: SORE;DISCOMFORT

## 2024-09-29 ASSESSMENT — PAIN SCALES - GENERAL
PAINLEVEL_OUTOF10: 4
PAINLEVEL_OUTOF10: 4
PAINLEVEL_OUTOF10: 3
PAINLEVEL_OUTOF10: 2
PAINLEVEL_OUTOF10: 3

## 2024-09-29 ASSESSMENT — PAIN DESCRIPTION - ORIENTATION
ORIENTATION: LOWER

## 2024-09-29 ASSESSMENT — PAIN DESCRIPTION - LOCATION
LOCATION: ABDOMEN
LOCATION: ABDOMEN

## 2024-09-29 ASSESSMENT — PAIN - FUNCTIONAL ASSESSMENT
PAIN_FUNCTIONAL_ASSESSMENT: ACTIVITIES ARE NOT PREVENTED

## 2024-09-29 NOTE — PLAN OF CARE
Problem: Vaginal Birth or  Section  Goal: Fetal and maternal status remain reassuring during the birth process  Description:  Birth OB-Pregnancy care plan goal which identifies if the fetal and maternal status remain reassuring during the birth process  2024 0043 by Liana Colindres RN  Outcome: Progressing  2024 1329 by Sunni Mathew RN  Outcome: Progressing     Problem: Pain  Goal: Verbalizes/displays adequate comfort level or baseline comfort level  2024 0043 by Liana Colindres RN  Outcome: Progressing  Flowsheets  Taken 2024 0000 by Liana Colindres RN  Verbalizes/displays adequate comfort level or baseline comfort level: Assess pain using appropriate pain scale  Taken 2024 1607 by Kinza Meraz RN  Verbalizes/displays adequate comfort level or baseline comfort level:   Encourage patient to monitor pain and request assistance   Assess pain using appropriate pain scale   Administer analgesics based on type and severity of pain and evaluate response   Implement non-pharmacological measures as appropriate and evaluate response   Consider cultural and social influences on pain and pain management   Notify Licensed Independent Practitioner if interventions unsuccessful or patient reports new pain  2024 1329 by Sunni Mathew RN  Outcome: Progressing     Problem: Safety - Adult  Goal: Free from fall injury  2024 by Liana Colindres RN  Outcome: Progressing  2024 1329 by Sunni Mathew RN  Outcome: Progressing

## 2024-09-29 NOTE — PROGRESS NOTES
[employer] [place] [facility] screening results were discussed with parent. Questions answered. Brochure given to parent. Advised to monitor developmental milestones and contact physician for any concerns.   Gina Quevedo, AuD

## 2024-09-29 NOTE — ANESTHESIA POSTPROCEDURE EVALUATION
Department of Anesthesiology  Postprocedure Note    Patient: Marilou Mosqueda  MRN: [id number]  YOB: 1995  Date of evaluation: 9/29/2024    Procedure Summary       Date: 09/28/24 Room / Location:     Anesthesia Start: 0000 Anesthesia Stop: 1108    Procedure: Labor Analgesia Diagnosis:     Scheduled Providers:  Responsible Provider: Graham Garcia DO    Anesthesia Type: general, epidural, spinal ASA Status: 2            Anesthesia Type: No value filed.    Tyler Phase I:      Tyler Phase II:      Anesthesia Post Evaluation    Patient location during evaluation: bedside  Patient participation: complete - patient participated  Level of consciousness: awake and alert  Pain score: 0  Airway patency: patent  Nausea & Vomiting: no nausea and no vomiting  Cardiovascular status: hemodynamically stable  Respiratory status: acceptable  Hydration status: euvolemic  Pain management: adequate        No notable events documented.

## 2024-09-29 NOTE — LACTATION NOTE
Breastfeeding going well. Still having some discomfort with latch. I encouraged patient to call me at next feed for observation/assistance.

## 2024-09-30 VITALS
HEART RATE: 87 BPM | WEIGHT: 150 LBS | TEMPERATURE: 98.3 F | HEIGHT: 66 IN | BODY MASS INDEX: 24.11 KG/M2 | RESPIRATION RATE: 16 BRPM | DIASTOLIC BLOOD PRESSURE: 72 MMHG | OXYGEN SATURATION: 97 % | SYSTOLIC BLOOD PRESSURE: 119 MMHG

## 2024-09-30 PROBLEM — Z34.03 PRIMIGRAVIDA IN THIRD TRIMESTER: Status: RESOLVED | Noted: 2024-05-30 | Resolved: 2024-09-30

## 2024-09-30 PROBLEM — Z37.9 NORMAL LABOR: Status: RESOLVED | Noted: 2024-09-28 | Resolved: 2024-09-30

## 2024-09-30 PROBLEM — Z3A.39 39 WEEKS GESTATION OF PREGNANCY: Status: RESOLVED | Noted: 2024-09-27 | Resolved: 2024-09-30

## 2024-09-30 PROBLEM — O26.843 FUNDAL HEIGHT LOW FOR DATES IN THIRD TRIMESTER: Status: RESOLVED | Noted: 2024-09-28 | Resolved: 2024-09-30

## 2024-09-30 PROBLEM — O43.199 MARGINAL INSERTION OF UMBILICAL CORD AFFECTING MANAGEMENT OF MOTHER: Status: RESOLVED | Noted: 2024-05-30 | Resolved: 2024-09-30

## 2024-09-30 PROBLEM — O09.813 PREGNANCY RESULTING FROM IN VITRO FERTILIZATION IN THIRD TRIMESTER: Status: RESOLVED | Noted: 2024-05-30 | Resolved: 2024-09-30

## 2024-09-30 PROCEDURE — 6370000000 HC RX 637 (ALT 250 FOR IP): Performed by: OBSTETRICS & GYNECOLOGY

## 2024-09-30 RX ORDER — PSEUDOEPHEDRINE HCL 30 MG
100 TABLET ORAL 2 TIMES DAILY PRN
COMMUNITY
Start: 2024-09-30

## 2024-09-30 RX ORDER — MODIFIED LANOLIN
1 OINTMENT (GRAM) TOPICAL PRN
COMMUNITY
Start: 2024-09-30

## 2024-09-30 RX ORDER — FERROUS SULFATE 325(65) MG
325 TABLET ORAL
Qty: 30 TABLET | Refills: 3 | Status: SHIPPED | OUTPATIENT
Start: 2024-09-30

## 2024-09-30 RX ORDER — IBUPROFEN 600 MG/1
600 TABLET, FILM COATED ORAL EVERY 6 HOURS PRN
Qty: 60 TABLET | Refills: 1 | Status: SHIPPED | OUTPATIENT
Start: 2024-09-30

## 2024-09-30 RX ORDER — ACETAMINOPHEN 500 MG
1000 TABLET ORAL EVERY 6 HOURS PRN
COMMUNITY
Start: 2024-09-30

## 2024-09-30 RX ADMIN — FERROUS SULFATE TAB 325 MG (65 MG ELEMENTAL FE) 325 MG: 325 (65 FE) TAB at 08:22

## 2024-09-30 RX ADMIN — Medication: at 08:35

## 2024-09-30 RX ADMIN — DOCUSATE SODIUM 100 MG: 100 CAPSULE, LIQUID FILLED ORAL at 08:22

## 2024-09-30 RX ADMIN — PRENATAL WITH FERROUS FUM AND FOLIC ACID 1 TABLET: 3080; 920; 120; 400; 22; 1.84; 3; 20; 10; 1; 12; 200; 27; 25; 2 TABLET ORAL at 08:22

## 2024-09-30 RX ADMIN — IBUPROFEN 600 MG: 600 TABLET, FILM COATED ORAL at 08:22

## 2024-09-30 ASSESSMENT — PAIN DESCRIPTION - DESCRIPTORS: DESCRIPTORS: CRAMPING;DISCOMFORT

## 2024-09-30 ASSESSMENT — PAIN SCALES - GENERAL
PAINLEVEL_OUTOF10: 2
PAINLEVEL_OUTOF10: 0
PAINLEVEL_OUTOF10: 0

## 2024-09-30 ASSESSMENT — PAIN - FUNCTIONAL ASSESSMENT: PAIN_FUNCTIONAL_ASSESSMENT: ACTIVITIES ARE NOT PREVENTED

## 2024-09-30 ASSESSMENT — PAIN DESCRIPTION - LOCATION: LOCATION: ABDOMEN

## 2024-09-30 NOTE — DISCHARGE INSTRUCTIONS
OB/ER if you are saturating more than one maxi pad in an hour.    BREAST CARE  Take medications as recommended by your doctor or midwife for pain  If you develop a warm, red, tender area on your breast or develop a fever contact your OB provider.    For breastfeeding moms:  If you become engorged, feeding may be more difficult or painful for 1-2 days.  You may find it helpful to hand express some milk so that the infant can latch on more easily.   While breastfeeding, continue to take your prenatal vitamins as directed by your doctor or midwife.   Only take medications verified as safe for breastfeeding.    For non-breastfeeding moms:  You may apply ice packs to your breasts over your bra for twenty minutes at a time for comfort.  Avoid stimulation to your breasts, when showering allow the water to strike your back not your breasts.    Wear a good fitting bra until your milk dries, such as a sports bra.    INCISIONAL CARE / RITU CARE  Clean your incision in the shower with mild soap.  After shower pat the incision area dry and leave open to air.  If used, Steri-stipes should be removed by 2 weeks.  If used, Staples should be removed by the OB in office by 1 week.  If used/ordered, an abdominal binder may provide support for your incision.   Use the ritu-bottle after toileting until bleeding stops.  Cleanse your perineum from front to back  If used, stitches or internal clips will dissolve in 4-6 weeks.  You may use a sitz bath or soak in a clean tub as needed for comfort.  Kegel exercises will help restore bladder control.     SWELLING  Keep your legs elevated when sitting or lying.   When wearing stocking or socks, make sure they are not too tight.    WHEN TO CALL THE DOCTOR  If you have a temp of 100.4 or more.   If your bleeding has increased and you are saturating a pad in an hour.  Your abdomen is tender to touch.  You are passing blood clots bigger than the size of a lemon.  If you are experiencing extreme

## 2024-09-30 NOTE — DISCHARGE INSTR - ACTIVITY
After Your Delivery Discharge Instructions    What to do after you leave the hospital:    Recommended diet: regular diet  Recommended activity: No driving for 1 weeks, no sex or tampon use for 6 weeks. No douching.  No heavy lifting (greater than baby and carrier) for 6 weeks.  Initially, avoid frequent ambulation with stairs - start slowly then increase as tolerated.  You may return to work in 6 weeks.  Showers are fine - avoid bath tubs, hot tubs, swimming.  Follow-up in 6 weeks for final postpartum visit.  Continue taking your prenatal vitamins until that time at a minimum.  You should remain on your prenatal vitamins if you continue to breast-feed beyond that.    Call the Physician with any of these signs and symptoms:    Warning signs regarding stitches:  \"Popping\" of stitches  Foul smelling discharge or pus  More redness or streaks around stitches than before    Perineum / episiotomy care:  Continue care as in the hospital (sitz baths, squirt bottle, etc.)  No tub baths until OK'd by your Physician , showers are fine     After your delivery - signs and symptoms to watch for:  Fever - Oral temperature greater than 100.4 degrees Fahrenheit  Foul-smelling vaginal discharge  Headache unrelieved by \"pain meds\"  Difficulty urinating  Breasts reddened, hard, hot to the touch  Nipple discharge which is foul-smelling or contains pus  Increased pain at the site of the laceration repair  Difficulty breathing with or without chest pain  New calf pain especially if only on one side  Sudden, continuing increased vaginal bleeding (heavier than a period) with or without clots  Unrelieved feelings of:  Inability to cope  Sadness  Anxiety  Lack of interest in baby  Insomnia  Crying     What to do at home:  Resume activity gradually   Don't lift anything heavier than baby and carrier until OK'd by your Physician   No sex until OK'd by your Physician  Eat regular nutritious meals  Take pain medication as prescribed whenever you

## 2024-09-30 NOTE — DISCHARGE SUMMARY
Department of Obstetrics and Gynecology  Labor and Delivery  Discharge Summary    Patient:  Marilou Mosqueda         Medical Record Number:  [id number]    Admit Date:  2024  8:13 PM    Discharge Date: 2024    Final Diagnosis:   Principal Problem (Resolved):    39 1/7 weeks gestation of pregnancy  Active Problems:     (normal spontaneous vaginal delivery)    Term birth of  female    Second degree perineal laceration, delivered, current hospitalization    Lacerations of bilateral labia majora (superficial)    Acute blood loss anemia    History of polycystic ovarian syndrome - dx by WILL ; failed IUI x 3, +HCG with second IVF    Iron deficiency anemia of pregnancy  Resolved Problems:    Primigravida in third trimester    Marginal insertion of umbilical cord affecting management of mother    Fundal height low for dates in third trimester starting at 36w (34 cm, EFW 7#0 48.7% at 37w6d)    Normal spontaneous labor    Meconium in amniotic fluid    Double nuchal cord    Postpartum hemorrhage, delivered, current hospitalization    Pregnancy resulting from in vitro fertilization in third trimester    Infertility, 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 [place] female  with a history of PCOS and infertility who conceived by IVF and had a pregnancy that was complicated by a marginal umbilical cord insertion, a lagging fundal height starting at 36 weeks (EFW 48.7% at 37 w) and iron deficiency anemia  at 39w1d admitted amanda 2 to 5 minutes apart, 4 cm dilated in the active phase of labor.    Hospital Course:   Delivery Summary:   Procedure Date: 2024 12:41 PM      Pre-delivery Diagnosis: Primigravida IUP at 39 weeks 1 day in spontaneous labor, meconium fluid, marginal insertion of umbilical cord, fundal height low for dates (starting at 36 weeks, EFW 7#0 at 37w 48.7%), iron deficiency

## 2024-09-30 NOTE — LACTATION NOTE
Checked in on patient. She had a video of baby's last latch. Baby's mouth appears to be wide open. I recommended flipping baby's top lip out. She explained she is having some pain with using the hand pump. She would benefit from a much smaller flange size. I recommended flange inserts, as she will likely need a size smaller than a 21 mm. Due to still having sore nipples, I recommended using the hydrogel pads and explained use. I encouraged her to call for assistance today.

## 2024-09-30 NOTE — PROGRESS NOTES
PPD #2     Patient:  Marilou Mosqueda     Admit Date:  9/27/2024  8:13 PM  Medical Record Number:  [id number]   Today's Date: 9/30/2024    S: No complaints; tolerating diet: yes - general ; ambulating well: yes - up in room; voiding without difficulty:  yes - has no urinary complaints; bm: denies urge; flatus: yes - normal; pain meds appropriate: yes - Ibuprofen and tylenol; vaginal bleeding: lighter than a period.    O:   Vitals:    09/29/24 0830 09/29/24 1614 09/29/24 2259 09/30/24 0700   BP:  104/68 122/73 119/72   Pulse:  89 (!) 106 87   Resp:  16 16 16   Temp:  98.2 °F (36.8 °C) 98.2 °F (36.8 °C) 98.3 °F (36.8 °C)   TempSrc:  Oral Oral Oral   SpO2: 99% 99%  97%   Weight:       Height:         Gen: A&O, cooperative, pleasant   Heart: RRR   Lungs: CTA b/l   Abd; soft, NT, non distended, +BS  Back: no CVA or paraspinal tenderness   Ext: No clubbing, cyanosis, edema:1+ b/l pedal , no cords palpable, no calf tenderness   Neuro: intact   Uterus: firm, well contracted, nt   Perineum: intact, healing well   Justyna pad: staining only, thin lochia    Lab Results   Component Value Date    HGB 7.2 (L) 09/29/2024    HCT 22.6 (L) 09/29/2024      Recent Results (from the past 72 hour(s))   Drug Screen Multi Urine With Bup    Collection Time: 09/27/24  9:00 PM   Result Value Ref Range    Amphetamine Screen, Ur NEGATIVE NEGATIVE    Barbiturate Screen, Ur NEGATIVE NEGATIVE    Cocaine Metabolite, Urine NEGATIVE NEGATIVE    Benzodiazepine Screen, Urine NEGATIVE NEGATIVE    Buprenorphine Urine NEGATIVE NEGATIVE    Methadone Screen, Urine NEGATIVE NEGATIVE    Opiates, Urine NEGATIVE NEGATIVE    Phencyclidine, Urine NEGATIVE NEGATIVE    Cannabinoid Scrn, Ur NEGATIVE NEGATIVE    Fentanyl, Ur NEGATIVE NEGATIVE    Oxycodone Screen, Ur NEGATIVE NEGATIVE    Test Information       These drug screen results are for medical purposes only and should not be considered definitive or confirmed.   TYPE AND SCREEN    Collection Time: 09/27/24  9:10

## 2024-09-30 NOTE — PROGRESS NOTES
PPD #1    Patient:  Marilou Mosqueda     Admit Date:  9/27/2024  8:13 PM  Medical Record Number:  [id number]   Today's Date: 9/29/2024    S: No complaints, doing well; tolerating diet: yes - general; ambulating well: yes - up in room and halls; voiding without difficulty:  yes - has no urinary complaints; bm: denies urge; flatus: yes - normal; pain meds appropriate: yes - Ibuprofen 600 mg and Tylenol; vaginal bleeding: a little heavier than her normal period.    O:   Vitals:    09/28/24 2345 09/29/24 0700 09/29/24 0830 09/29/24 1614   BP: (!) 103/55 110/67  104/68   Pulse: 99 76  89   Resp: 16 16  16   Temp: 99 °F (37.2 °C) 98.2 °F (36.8 °C)  98.2 °F (36.8 °C)   TempSrc: Oral Oral  Oral   SpO2:   99% 99%   Weight:       Height:         Gen: A&O, cooperative, pleasant   Heart: RRR   Lungs: CTA b/l   Abd; soft, NT, non distended, +BS  Back: no CVA or paraspinal tenderness   Ext: No clubbing, cyanosis, edema:1+ b/l pedal, no cords palpable, no calf tenderness   Neuro: intact   Uterus: firm, well contracted, nt   Perineum: intact, healing well   Justyna pad: staining only, thin lochia    Lab Results   Component Value Date    HGB 7.2 (L) 09/29/2024    HCT 22.6 (L) 09/29/2024      Recent Results (from the past 72 hour(s))   Drug Screen Multi Urine With Bup    Collection Time: 09/27/24  9:00 PM   Result Value Ref Range    Amphetamine Screen, Ur NEGATIVE NEGATIVE    Barbiturate Screen, Ur NEGATIVE NEGATIVE    Cocaine Metabolite, Urine NEGATIVE NEGATIVE    Benzodiazepine Screen, Urine NEGATIVE NEGATIVE    Buprenorphine Urine NEGATIVE NEGATIVE    Methadone Screen, Urine NEGATIVE NEGATIVE    Opiates, Urine NEGATIVE NEGATIVE    Phencyclidine, Urine NEGATIVE NEGATIVE    Cannabinoid Scrn, Ur NEGATIVE NEGATIVE    Fentanyl, Ur NEGATIVE NEGATIVE    Oxycodone Screen, Ur NEGATIVE NEGATIVE    Test Information       These drug screen results are for medical purposes only and should not be considered definitive or confirmed.   TYPE AND

## 2025-01-09 ENCOUNTER — OFFICE VISIT (OUTPATIENT)
Dept: ORTHOPEDIC SURGERY | Age: 30
End: 2025-01-09
Payer: COMMERCIAL

## 2025-01-09 VITALS — BODY MASS INDEX: 19.29 KG/M2 | WEIGHT: 120 LBS | HEIGHT: 66 IN

## 2025-01-09 DIAGNOSIS — M25.531 RIGHT WRIST PAIN: ICD-10-CM

## 2025-01-09 DIAGNOSIS — M65.4 DE QUERVAIN'S TENOSYNOVITIS, RIGHT: Primary | ICD-10-CM

## 2025-01-09 PROCEDURE — 99203 OFFICE O/P NEW LOW 30 MIN: CPT | Performed by: PHYSICIAN ASSISTANT

## 2025-01-09 RX ORDER — PREDNISONE 10 MG/1
TABLET ORAL
Qty: 6 TABLET | Refills: 0 | Status: SHIPPED | OUTPATIENT
Start: 2025-01-09

## 2025-01-09 NOTE — PATIENT INSTRUCTIONS
*At this time I have recommended an oral steroid, Prednisone 10mg 2 tab PO daily x 2 days, then 1 tab PO daily x 2 days then STOP. I did discuss potential side effect such as GI upset, mood changes, depression, anxiety, change in sleep habits.  The patient develops any of these signs or symptoms they will call the office immediately and we will discontinue the medication in appropriate fashion.  They are aware that he should not use any other oral anti-inflammatories while on the oral steroids.  They can use Tylenol OTC PRN.    *Patient can use VOLTAREN GEL 1% (DICLOFENAC SODIUM) Apply 2 grams twice daily to the affected wrist as needed, which can be obtained over the counter.

## 2025-01-09 NOTE — PROGRESS NOTES
[facility]  New Patient Note      CHIEF COMPLAINT:   Chief Complaint   Patient presents with    Wrist Pain     Pt presents this AM with c/o pain in her R wrist. States that she has noticed it for about 1 month, and believes that symptoms may be caused from feeding her baby. Pain is in radial side of wrist.        HISTORY OF PRESENT ILLNESS:                The patient is a 29 y.o. female who presents today with complaints of right wrist pain x 1 month, no specific RADHA.  States believes may be due to feeding her baby..  Pt localizes the pain to radial aspect of the wrist.  Pt denies any numbness, tingling, loss of sensation or radiation of symptoms into fingers.  Pain is worse with holding her baby for breast-feeding, gripping, range of motion.  They have tried at home therapies of none.  Pt has never injured this wrist in the past.  Pt is right hand dominant.        Past Medical History:        Diagnosis Date    History of polycystic ovarian syndrome - dx by [facility] ; failed IUI x 3, +HCG with second IVF 2023    Infertility, female      Past Surgical History:    No past surgical history on file.  Current Medications:   No current facility-administered medications for this visit.  Allergies:  Patient has no known allergies.    Social History:   TOBACCO:   reports that she has never smoked. She has never used smokeless tobacco.  ETOH:   reports no history of alcohol use.  DRUGS:   reports no history of drug use.    Review of Systems   Constitutional: Negative for fever, chills, diaphoresis, appetite change and fatigue.   HENT: Negative for dental issues, hearing loss and tinnitus. Negative for congestion, sinus pressure, sneezing, sore throat. Negative for headache.  Eyes: Negative for visual disturbance, blurred and double vision. Negative for pain, discharge, redness and itching  Respiratory: Negative for cough, shortness of breath and wheezing.   Cardiovascular: Negative for chest pain,